# Patient Record
(demographics unavailable — no encounter records)

---

## 2019-01-07 NOTE — NUR
Pt request to speak with NP, pt states eulogio would like to know why she is 
having pain, to order test to find out source or reason for pain. idalia request 
NP to further discuss diagnosis. pt states pain is improved 6/10.

## 2019-01-07 NOTE — NUR
Patient given written and verbal discharge instructions and verbalizes 
understanding.  ER NP discussed with patient the results and treatment 
provided. Patient in stable condition. ID arm band removed.

Rx of  Motrin given. Patient educated on pain management and to follow up with 
PMD. Pain Scale .

Opportunity for questions provided and answered. Medication side effect fact 
sheet provided. Pt states she has an appointment with PMD tonight at 2030 for 
follow-up

## 2019-01-07 NOTE — NUR
Pt brought by self ,A&Ox4, pt presents to ER with neck pain radiating to 
shoulders,denies trauma, skin pink and warm, cap refill <3, VS WNL, afebrile.

## 2020-01-03 NOTE — NUR
Patient given written and verbal discharge instructions and verbalizes 
understanding.  ER MD discussed with patient the results and treatment 
provided. Patient in stable condition. ID arm band removed. 

Rx of atarax given. Patient educated on pain management and to follow up with 
PMD. Pain Scale 0/10.

Opportunity for questions provided and answered. Medication side effect fact 
sheet provided.

## 2020-01-03 NOTE — NUR
Patient presented to ER C/O Anxiety. Patient A&Ox4, skin pink and warm, 
wheelchair to ER, afebrile, skin pink and warm, jittery, no distress noted, 
placed on cardiac monitor and pulse-ox monitor upon arrival. Patient states she 
has anxiety increased today, pt states she was seen in [facility] ER yesterday for 
anxiety.

## 2020-01-03 NOTE — NUR
Patient triaged and placed in waiting room. VSS and patient appears in no acute 
distress at this time. Accompanied by SPOUSE, awaiting available bed, and MD 
notified of need for MSE.